# Patient Record
Sex: MALE | Race: OTHER | HISPANIC OR LATINO | URBAN - METROPOLITAN AREA
[De-identification: names, ages, dates, MRNs, and addresses within clinical notes are randomized per-mention and may not be internally consistent; named-entity substitution may affect disease eponyms.]

---

## 2018-01-15 ENCOUNTER — EMERGENCY (EMERGENCY)
Facility: HOSPITAL | Age: 23
LOS: 0 days | Discharge: HOME | End: 2018-01-16
Attending: EMERGENCY MEDICINE | Admitting: EMERGENCY MEDICINE
Payer: MEDICAID

## 2018-01-15 VITALS
SYSTOLIC BLOOD PRESSURE: 133 MMHG | RESPIRATION RATE: 18 BRPM | DIASTOLIC BLOOD PRESSURE: 76 MMHG | TEMPERATURE: 98 F | OXYGEN SATURATION: 100 % | HEART RATE: 77 BPM

## 2018-01-15 VITALS — WEIGHT: 175.05 LBS | HEIGHT: 69 IN

## 2018-01-15 DIAGNOSIS — S09.90XA UNSPECIFIED INJURY OF HEAD, INITIAL ENCOUNTER: ICD-10-CM
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
## 2018-01-15 DIAGNOSIS — R55 SYNCOPE AND COLLAPSE: ICD-10-CM
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
## 2018-01-15 LAB
ALBUMIN SERPL ELPH-MCNC: 4.3 G/DL — SIGNIFICANT CHANGE UP (ref 3.3–5)
ALP SERPL-CCNC: 95 U/L — SIGNIFICANT CHANGE UP (ref 40–120)
ALT FLD-CCNC: 45 U/L — SIGNIFICANT CHANGE UP (ref 12–78)
ANION GAP SERPL CALC-SCNC: 9 MMOL/L — SIGNIFICANT CHANGE UP (ref 5–17)
AST SERPL-CCNC: 39 U/L — HIGH (ref 15–37)
BASOPHILS # BLD AUTO: 0.1 K/UL — SIGNIFICANT CHANGE UP (ref 0–0.2)
BASOPHILS NFR BLD AUTO: 0.9 % — SIGNIFICANT CHANGE UP (ref 0–2)
BILIRUB SERPL-MCNC: 0.6 MG/DL — SIGNIFICANT CHANGE UP (ref 0.2–1.2)
BUN SERPL-MCNC: 13 MG/DL — SIGNIFICANT CHANGE UP (ref 7–23)
CALCIUM SERPL-MCNC: 9.7 MG/DL — SIGNIFICANT CHANGE UP (ref 8.5–10.1)
CHLORIDE SERPL-SCNC: 106 MMOL/L — SIGNIFICANT CHANGE UP (ref 96–108)
CO2 SERPL-SCNC: 26 MMOL/L — SIGNIFICANT CHANGE UP (ref 22–31)
CREAT SERPL-MCNC: 1.01 MG/DL — SIGNIFICANT CHANGE UP (ref 0.5–1.3)
EOSINOPHIL # BLD AUTO: 0 K/UL — SIGNIFICANT CHANGE UP (ref 0–0.5)
EOSINOPHIL NFR BLD AUTO: 0.2 % — SIGNIFICANT CHANGE UP (ref 0–6)
GLUCOSE SERPL-MCNC: 82 MG/DL — SIGNIFICANT CHANGE UP (ref 70–99)
HCT VFR BLD CALC: 45.8 % — SIGNIFICANT CHANGE UP (ref 39–50)
HGB BLD-MCNC: 15.2 G/DL — SIGNIFICANT CHANGE UP (ref 13–17)
LYMPHOCYTES # BLD AUTO: 1.8 K/UL — SIGNIFICANT CHANGE UP (ref 1–3.3)
LYMPHOCYTES # BLD AUTO: 13.7 % — SIGNIFICANT CHANGE UP (ref 13–44)
MCHC RBC-ENTMCNC: 29.1 PG — SIGNIFICANT CHANGE UP (ref 27–34)
MCHC RBC-ENTMCNC: 33.1 GM/DL — SIGNIFICANT CHANGE UP (ref 32–36)
MCV RBC AUTO: 87.8 FL — SIGNIFICANT CHANGE UP (ref 80–100)
MONOCYTES # BLD AUTO: 0.8 K/UL — SIGNIFICANT CHANGE UP (ref 0–0.9)
MONOCYTES NFR BLD AUTO: 5.9 % — SIGNIFICANT CHANGE UP (ref 2–14)
NEUTROPHILS # BLD AUTO: 10.3 K/UL — HIGH (ref 1.8–7.4)
NEUTROPHILS NFR BLD AUTO: 79.3 % — HIGH (ref 43–77)
PLATELET # BLD AUTO: 282 K/UL — SIGNIFICANT CHANGE UP (ref 150–400)
POTASSIUM SERPL-MCNC: 3.4 MMOL/L — LOW (ref 3.5–5.3)
POTASSIUM SERPL-SCNC: 3.4 MMOL/L — LOW (ref 3.5–5.3)
PROT SERPL-MCNC: 8.1 GM/DL — SIGNIFICANT CHANGE UP (ref 6–8.3)
RBC # BLD: 5.21 M/UL — SIGNIFICANT CHANGE UP (ref 4.2–5.8)
RBC # FLD: 11.2 % — SIGNIFICANT CHANGE UP (ref 10.3–14.5)
SODIUM SERPL-SCNC: 141 MMOL/L — SIGNIFICANT CHANGE UP (ref 135–145)
WBC # BLD: 13 K/UL — HIGH (ref 3.8–10.5)
WBC # FLD AUTO: 13 K/UL — HIGH (ref 3.8–10.5)

## 2018-01-15 PROCEDURE — 70450 CT HEAD/BRAIN W/O DYE: CPT | Mod: 26

## 2018-01-15 PROCEDURE — 99284 EMERGENCY DEPT VISIT MOD MDM: CPT

## 2018-01-15 PROCEDURE — 93010 ELECTROCARDIOGRAM REPORT: CPT

## 2018-01-15 RX ORDER — SODIUM CHLORIDE 9 MG/ML
1000 INJECTION INTRAMUSCULAR; INTRAVENOUS; SUBCUTANEOUS ONCE
Qty: 0 | Refills: 0 | Status: COMPLETED | OUTPATIENT
Start: 2018-01-15 | End: 2018-01-15

## 2018-01-15 RX ADMIN — SODIUM CHLORIDE 1000 MILLILITER(S): 9 INJECTION INTRAMUSCULAR; INTRAVENOUS; SUBCUTANEOUS at 22:15

## 2018-01-15 NOTE — ED STATDOCS - CARE PLAN
Principal Discharge DX:	Syncope, unspecified syncope type  Secondary Diagnosis:	Injury of head, initial encounter
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
## 2018-01-15 NOTE — ED STATDOCS - ATTENDING CONTRIBUTION TO CARE
Attending Contribution to Care: I, Adriana Lopez, performed the initial face to face bedside interview with this patient regarding history of present illness, review of symptoms and relevant past medical, social and family history.  I completed an independent physical examination.  I was the initial provider who evaluated this patient and the history, physical, and MDM reflect this intial assessment. I have signed out the follow up of any pending tests after the original (i.e. labs, radiological studies) to the ACP with instructions to review any with instructions to review any concerning findings to me prior to discharge.  I have communicated the patient’s plan of care and disposition with the ACP.
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
## 2018-01-15 NOTE — ED ADULT NURSE REASSESSMENT NOTE - NS ED NURSE REASSESS COMMENT FT1
Pt resting comfortably in chair with no acute distress noted. states feels better. Denies cp,sob,ha,dz,n/v/d/fever/chills or urinary sx at this time. Vitals stable. plan of cared updated on pt. Will cont to monitor for safety and comfort.

## 2019-01-16 ENCOUNTER — EMERGENCY (EMERGENCY)
Facility: HOSPITAL | Age: 24
LOS: 1 days | Discharge: HOME | End: 2019-01-16
Attending: EMERGENCY MEDICINE
Payer: COMMERCIAL

## 2019-01-16 VITALS
DIASTOLIC BLOOD PRESSURE: 65 MMHG | RESPIRATION RATE: 18 BRPM | HEART RATE: 107 BPM | SYSTOLIC BLOOD PRESSURE: 114 MMHG | OXYGEN SATURATION: 97 % | TEMPERATURE: 98 F

## 2019-01-16 VITALS — WEIGHT: 171.96 LBS | HEIGHT: 67 IN

## 2019-01-16 LAB
APPEARANCE UR: CLEAR — SIGNIFICANT CHANGE UP
BILIRUB UR-MCNC: NEGATIVE — SIGNIFICANT CHANGE UP
COLOR SPEC: YELLOW — SIGNIFICANT CHANGE UP
DIFF PNL FLD: NEGATIVE — SIGNIFICANT CHANGE UP
EPI CELLS # UR: SIGNIFICANT CHANGE UP
GLUCOSE UR QL: NEGATIVE MG/DL — SIGNIFICANT CHANGE UP
KETONES UR-MCNC: ABNORMAL
LEUKOCYTE ESTERASE UR-ACNC: ABNORMAL
NITRITE UR-MCNC: NEGATIVE — SIGNIFICANT CHANGE UP
PH UR: 5 — SIGNIFICANT CHANGE UP (ref 5–8)
PROT UR-MCNC: 15 MG/DL
RBC CASTS # UR COMP ASSIST: SIGNIFICANT CHANGE UP /HPF (ref 0–4)
SP GR SPEC: 1.02 — SIGNIFICANT CHANGE UP (ref 1.01–1.02)
UROBILINOGEN FLD QL: NEGATIVE MG/DL — SIGNIFICANT CHANGE UP
WBC UR QL: SIGNIFICANT CHANGE UP

## 2019-01-16 PROCEDURE — 81001 URINALYSIS AUTO W/SCOPE: CPT

## 2019-01-16 PROCEDURE — 99284 EMERGENCY DEPT VISIT MOD MDM: CPT | Mod: 25

## 2019-01-16 PROCEDURE — 87491 CHLMYD TRACH DNA AMP PROBE: CPT

## 2019-01-16 PROCEDURE — 87591 N.GONORRHOEAE DNA AMP PROB: CPT

## 2019-01-16 PROCEDURE — 76870 US EXAM SCROTUM: CPT

## 2019-01-16 PROCEDURE — 96372 THER/PROPH/DIAG INJ SC/IM: CPT

## 2019-01-16 PROCEDURE — 76870 US EXAM SCROTUM: CPT | Mod: 26

## 2019-01-16 PROCEDURE — 99284 EMERGENCY DEPT VISIT MOD MDM: CPT

## 2019-01-16 RX ORDER — KETOROLAC TROMETHAMINE 30 MG/ML
60 SYRINGE (ML) INJECTION ONCE
Qty: 0 | Refills: 0 | Status: DISCONTINUED | OUTPATIENT
Start: 2019-01-16 | End: 2019-01-16

## 2019-01-16 RX ADMIN — Medication 60 MILLIGRAM(S): at 20:45

## 2019-01-16 NOTE — ED PROVIDER NOTE - CPE EDP EYES NORM
Received PA request for Contour next strips. When [facility] stated they could not find pt in their system. Tried to reach out to the parents of Dominga Knott to see if insurance has changed. No answer, left VM to call back.
normal...

## 2019-01-16 NOTE — ED PROVIDER NOTE - PHYSICAL EXAMINATION
/testicular: Normal lie, no significant swelling, left testicle mildly tender to palpation, venous "bag of worms like" sensation present posterior left testicle

## 2019-01-16 NOTE — ED PROVIDER NOTE - ATTENDING CONTRIBUTION TO CARE
Noemy: I performed a face to face bedside interview with patient regarding history of present illness, review of symptoms and past medical history. I completed an independent physical exam.  I have discussed patient's plan of care with advanced care provider.   I agree with note as stated above including HISTORY OF PRESENT ILLNESS, HIV, PAST MEDICAL/SURGICAL/FAMILY/SOCIAL HISTORY, ALLERGIES AND HOME MEDICATIONS, REVIEW OF SYSTEMS, PHYSICAL EXAM, MEDICAL DECISION MAKING and any PROGRESS NOTES during the time I functioned as the attending physician for this patient  unless otherwise noted. My brief assessment is as follows: 1 week of b/l testicle pain L>R, denies trauma. States noticed it when weight lifting. No discharge/dysuria. Sexually active with fiance only. No abd pain. Denies acute change in pain. nad, comfortable, abd benign. testicles with normal lie, no significant swelling, mild generalized tenderness, without tenderness over epididymis, posterior lower testicle with palpable venous plexus L>R. likely mild varicocele, u/s r/o torsion. urology f/u.

## 2019-01-16 NOTE — ED PROVIDER NOTE - OBJECTIVE STATEMENT
23y Male no pmhx presents to ED complaining of left testicular pain x 1 week. Patient states he was in working out in the gym when he first noticed the pain but states it came on suddenly with no known injury or trauma. Patient states the pain has been persistent for 1 week and worse when he sits down for prolonged periods of time. He states if he moves around or focuses on other things he can ignore the pain. Patient notes some swelling to left testicular area but no redness. Patient states he is sexually active with 1 female partner. No history STI/STD. Patient denies fever, chills, discharge, dysuria, frequency, urgency, n/v/d, headache, abdominal pain.

## 2019-01-16 NOTE — ED ADULT NURSE NOTE - NSIMPLEMENTINTERV_GEN_ALL_ED
Implemented All Universal Safety Interventions:  [place] to call system. Call bell, personal items and telephone within reach. Instruct patient to call for assistance. Room bathroom lighting operational. Non-slip footwear when patient is off stretcher. Physically safe environment: no spills, clutter or unnecessary equipment. Stretcher in lowest position, wheels locked, appropriate side rails in place.

## 2019-01-16 NOTE — ED PROVIDER NOTE - MEDICAL DECISION MAKING DETAILS
23y male presenting for L testicular pain x 1 week. Testicular sono to eval for torsion. UA, urine gc/chlamydia. will re-assess

## 2019-01-18 LAB
C TRACH RRNA SPEC QL NAA+PROBE: SIGNIFICANT CHANGE UP
N GONORRHOEA RRNA SPEC QL NAA+PROBE: SIGNIFICANT CHANGE UP
SPECIMEN SOURCE: SIGNIFICANT CHANGE UP

## 2021-01-09 ENCOUNTER — EMERGENCY (EMERGENCY)
Facility: HOSPITAL | Age: 26
LOS: 1 days | Discharge: HOME | End: 2021-01-09
Attending: EMERGENCY MEDICINE
Payer: COMMERCIAL

## 2021-01-09 VITALS
TEMPERATURE: 97 F | WEIGHT: 160.06 LBS | DIASTOLIC BLOOD PRESSURE: 77 MMHG | OXYGEN SATURATION: 100 % | SYSTOLIC BLOOD PRESSURE: 133 MMHG | RESPIRATION RATE: 18 BRPM | HEIGHT: 67 IN | HEART RATE: 85 BPM

## 2021-01-09 PROCEDURE — 99284 EMERGENCY DEPT VISIT MOD MDM: CPT | Mod: 25

## 2021-01-09 PROCEDURE — 70450 CT HEAD/BRAIN W/O DYE: CPT | Mod: 26

## 2021-01-09 PROCEDURE — 70450 CT HEAD/BRAIN W/O DYE: CPT

## 2021-01-09 PROCEDURE — 29515 APPLICATION SHORT LEG SPLINT: CPT

## 2021-01-09 PROCEDURE — 73610 X-RAY EXAM OF ANKLE: CPT

## 2021-01-09 PROCEDURE — 73562 X-RAY EXAM OF KNEE 3: CPT | Mod: 26,LT

## 2021-01-09 PROCEDURE — 73562 X-RAY EXAM OF KNEE 3: CPT

## 2021-01-09 PROCEDURE — 73080 X-RAY EXAM OF ELBOW: CPT | Mod: 26,50

## 2021-01-09 PROCEDURE — 72100 X-RAY EXAM L-S SPINE 2/3 VWS: CPT

## 2021-01-09 PROCEDURE — 73610 X-RAY EXAM OF ANKLE: CPT | Mod: 26,LT

## 2021-01-09 PROCEDURE — 73080 X-RAY EXAM OF ELBOW: CPT

## 2021-01-09 PROCEDURE — 72125 CT NECK SPINE W/O DYE: CPT | Mod: 26

## 2021-01-09 PROCEDURE — 72125 CT NECK SPINE W/O DYE: CPT

## 2021-01-09 PROCEDURE — 29515 APPLICATION SHORT LEG SPLINT: CPT | Mod: LT

## 2021-01-09 PROCEDURE — 72100 X-RAY EXAM L-S SPINE 2/3 VWS: CPT | Mod: 26

## 2021-01-09 RX ORDER — METHOCARBAMOL 500 MG/1
1500 TABLET, FILM COATED ORAL ONCE
Refills: 0 | Status: COMPLETED | OUTPATIENT
Start: 2021-01-09 | End: 2021-01-09

## 2021-01-09 RX ORDER — IBUPROFEN 200 MG
600 TABLET ORAL ONCE
Refills: 0 | Status: COMPLETED | OUTPATIENT
Start: 2021-01-09 | End: 2021-01-09

## 2021-01-09 RX ADMIN — METHOCARBAMOL 1500 MILLIGRAM(S): 500 TABLET, FILM COATED ORAL at 22:39

## 2021-01-09 RX ADMIN — Medication 600 MILLIGRAM(S): at 22:39
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
## 2021-01-09 NOTE — ED ADULT TRIAGE NOTE - WEIGHT METHOD
I reviewed the H&P, I examined the patient, and there are no changes in the patient's condition.     stated
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
## 2021-01-10 NOTE — ED PROVIDER NOTE - CARE PROVIDER_API CALL
Shaneka Muñoz)  Orthopedics  [address] [place] [address], [address]  [place] [address]  Phone: [phone]  Fax: [phone]  Follow Up Time:
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
## 2021-01-10 NOTE — ED PROVIDER NOTE - CARE PLAN
Principal Discharge DX:	Neck pain  Secondary Diagnosis:	Back pain  Secondary Diagnosis:	Ankle pain  Secondary Diagnosis:	Elbow pain
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
## 2021-01-10 NOTE — ED PROVIDER NOTE - NSFOLLOWUPINSTRUCTIONS_ED_ALL_ED_FT

## 2021-01-13 ENCOUNTER — APPOINTMENT (OUTPATIENT)
Dept: ORTHOPEDIC SURGERY | Facility: CLINIC | Age: 26
End: 2021-01-13
Payer: COMMERCIAL

## 2021-01-13 VITALS — SYSTOLIC BLOOD PRESSURE: 114 MMHG | DIASTOLIC BLOOD PRESSURE: 74 MMHG | HEART RATE: 82 BPM

## 2021-01-13 PROCEDURE — 73590 X-RAY EXAM OF LOWER LEG: CPT | Mod: LT

## 2021-01-13 PROCEDURE — 97760 ORTHOTIC MGMT&TRAING 1ST ENC: CPT

## 2021-01-13 PROCEDURE — 99072 ADDL SUPL MATRL&STAF TM PHE: CPT

## 2021-01-13 PROCEDURE — 99204 OFFICE O/P NEW MOD 45 MIN: CPT | Mod: 25

## 2021-01-13 RX ORDER — ASPIRIN 325 MG/1
325 TABLET, FILM COATED ORAL DAILY
Qty: 30 | Refills: 0 | Status: ACTIVE | COMMUNITY
Start: 2021-01-13 | End: 1900-01-01
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
## 2021-01-13 NOTE — DISCUSSION/SUMMARY
[de-identified] : Today I had a lengthy discussion with the patient regarding their left ankle pain. I have addressed all the patient's concerns surrounding the pathology of their condition. I recommend that the patient utilize a CAM boot. The patient was fitted with a CAM boot in the office today. The patient was educated about the boot wear pattern and utilization, as well as the timeframe to come out of the boot. He was also given full instructions for using the boot. I advised the patient to utilize 325 mg of aspirin as instructed for blood thinning purposes. The prescription for the aspirin was provided for the patient today. I recommend that the patient utilize ice, NSAIDS PRN, and heat. He can also elevate the left ankle above the level of the heart. At this time I would like to obtain advanced imaging of the patient's left ankle.  An MRI was ordered so I can find out more about the etiology of the patient's condition. The patient should follow up with the office after obtaining the MRI. The patient understood and verbally agreed to the treatment plan. All of their questions were answered and they were satisfied with the visit. The patient should call the office if they have any questions or experience worsening symptoms.

## 2021-01-13 NOTE — PHYSICAL EXAM
[de-identified] : General: Alert and oriented x3. In no acute distress. Pleasant in nature with a normal affect. No apparent respiratory distress.\par \par L Ankle Exam\par Skin: Clean, dry, intact\par Inspection: No obvious malalignment, no swelling, no effusion; no lymphadenopathy\par Pulses: 2+ DP/PT pulses\par ROM: L Ankle 10 degrees of dorsiflexion, 40 degrees of plantarflexion, 10 degrees of subtalar motion\par Tenderness: +syndesmosis pain. +medial sided pain, No tenderness over the lateral malleolus, no CFL/ATFL/PTFL pain. +medial malleolus pain pain, +medial deltoid ligament pain. +Positive proximal fibular pain. No heel pain.\par Stability: Negative anterior/posterior drawer.\par Strength: 5/5 TA/GS/EHL\par Neuro: In tact to light touch throughout\par Additional tests: Negative Mortons test, Negative syndesmosis squeeze test.  [de-identified] : 2V of the left tib-fib were ordered obtained and reviewed by me today, 01/13/2021 , revealed: No fracture.\par \par \par EXAM: ELBOW-BILATERAL\par  EXAM: XR KNEE 3 VIEWS LT\par  EXAM: ANKLE-LEFT\par \par PROCEDURE DATE: 01/09/2021\par \par \par \par INTERPRETATION: INDICATION: Trauma.\par \par TECHNIQUE: Frontal, lateral, and oblique views of the left ankle, knee, and elbow were performed and submitted for interpretation.\par \par COMPARISON: None.\par \par FINDINGS:\par \par There is no fracture or dislocation. Joint spaces and bone mineralization are within normal limits. Ankle mortise is intact. No knee or elbow joint effusion. Soft tissues are unremarkable for calcification or foreign body.\par \par IMPRESSION: No fracture.\par \par \par \par \par \par DAVID GOMEZ MD; Attending Radiologist\par This document has been electronically signed. Paul 10 2021 5:17AM

## 2021-01-13 NOTE — ADDENDUM
[FreeTextEntry1] : I, Jose Cruz Markham, acted solely as a scribe for Dr. Vitor Ortega on this date 01/13/2021 .\par All medical record entries made by the Scribe were at my, Dr. Vitor Ortega, direction and personally dictated by me on 01/13/2021 . I have reviewed the chart and agree that the record accurately reflects my personal performance of the history, physical exam, assessment and plan. I have also personally directed, reviewed, and agreed with the chart.

## 2021-01-13 NOTE — CONSULT LETTER
[Consult Letter:] : I had the pleasure of evaluating your patient, [unfilled]. [Please see my note below.] : Please see my note below. [Consult Closing:] : Thank you very much for allowing me to participate in the care of this patient.  If you have any questions, please do not hesitate to contact me. [Sincerely,] : Sincerely, [FreeTextEntry3] : Vitor Ortega, DO\par Foot and Ankle Surgery\par

## 2021-01-19 ENCOUNTER — OUTPATIENT (OUTPATIENT)
Dept: OUTPATIENT SERVICES | Facility: HOSPITAL | Age: 26
LOS: 1 days | End: 2021-01-19

## 2021-01-19 ENCOUNTER — RESULT REVIEW (OUTPATIENT)
Age: 26
End: 2021-01-19

## 2021-01-19 ENCOUNTER — APPOINTMENT (OUTPATIENT)
Dept: MRI IMAGING | Facility: CLINIC | Age: 26
End: 2021-01-19
Payer: COMMERCIAL

## 2021-01-19 PROCEDURE — 73721 MRI JNT OF LWR EXTRE W/O DYE: CPT | Mod: 26,LT

## 2021-01-22 ENCOUNTER — APPOINTMENT (OUTPATIENT)
Dept: ORTHOPEDIC SURGERY | Facility: CLINIC | Age: 26
End: 2021-01-22
Payer: COMMERCIAL
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
## 2021-01-22 DIAGNOSIS — S99.912A UNSPECIFIED INJURY OF LEFT ANKLE, INITIAL ENCOUNTER: ICD-10-CM

## 2021-01-22 DIAGNOSIS — S93.422A SPRAIN OF DELTOID LIGAMENT OF LEFT ANKLE, INITIAL ENCOUNTER: ICD-10-CM

## 2021-01-22 PROCEDURE — 99214 OFFICE O/P EST MOD 30 MIN: CPT

## 2021-01-22 PROCEDURE — 99072 ADDL SUPL MATRL&STAF TM PHE: CPT

## 2021-01-22 NOTE — ADDENDUM
[FreeTextEntry1] : I, Jose Cruz Markham, acted solely as a scribe for Dr. Vitor Ortega on this date 01/22/2021 .\par All medical record entries made by the Scribe were at my, Dr. Vitor Ortega, direction and personally dictated by me on 01/22/2021 . I have reviewed the chart and agree that the record accurately reflects my personal performance of the history, physical exam, assessment and plan. I have also personally directed, reviewed, and agreed with the chart.

## 2021-01-22 NOTE — DISCUSSION/SUMMARY
[de-identified] : Today I had a lengthy discussion with the patient regarding their left ankle pain. I have addressed all the patient's concerns surrounding the pathology of their condition. The MRI results were reviewed with the patient today. I recommend that the patient transition out of their CAM boot and into an ASO brace as tolerated. The patient was provided with the ASO brace in the office today. I recommend the patient undergo a course of physical therapy for the left ankle 2-3 times a week for a total of 6-8 weeks. A prescription was given for the physical therapy today. I would like to see the patient back in the office in 1 month to reassess their condition. The patient understood and verbally agreed to the treatment plan. All of their questions were answered and they were satisfied with the visit. The patient should call the office if they have any questions or experience worsening symptoms.

## 2021-01-22 NOTE — PHYSICAL EXAM
[de-identified] : General: Alert and oriented x3. In no acute distress. Pleasant in nature with a normal affect. No apparent respiratory distress.\par \par L Ankle Exam\par Skin: Clean, dry, intact\par Inspection: No obvious malalignment, no swelling, no effusion; no lymphadenopathy\par Pulses: 2+ DP/PT pulses\par ROM: L Ankle 10 degrees of dorsiflexion, 40 degrees of plantarflexion, 10 degrees of subtalar motion\par Tenderness: +syndesmosis pain. +medial sided pain, No tenderness over the lateral malleolus, no CFL/ATFL/PTFL pain. +medial malleolus pain pain, +medial deltoid ligament pain. +Positive proximal fibular pain. No heel pain.\par Stability: Negative anterior/posterior drawer.\par Strength: 5/5 TA/GS/EHL\par Neuro: In tact to light touch throughout\par Additional tests: Negative Mortons test, Negative syndesmosis squeeze test.  [de-identified] : An MRI was obtained of the left ankle from [facility] on 1/19/2021 and reviewed by me today 01/22/2021  in the office. Revealed: Remote ankle sprain with mild attenuation of the ATFL. The CFL and deltoid ligaments are intact.

## 2021-02-22 ENCOUNTER — APPOINTMENT (OUTPATIENT)
Dept: ORTHOPEDIC SURGERY | Facility: CLINIC | Age: 26
End: 2021-02-22

## 2022-01-16 NOTE — HISTORY OF PRESENT ILLNESS
Subjective:     HPI:   Tyrone Santos is a 56 y.o. male who presents 12 weeks out from left NATALIYA     Date of surgery: 8/23/21    Medications: tylenol    Assistive Devices: using walker out of house, using cane today    Limitations: 10lb lifting    Patient did have a fall in early November.  A CT scan was reviewed was read as possible superior lateral acetabular rim fracture but I think this was intraoperative change his implants look well fixed well oriented well aligned       Objective:   Body mass index is 30.41 kg/m².  Exam:    Gait: limp/antalgic/trendelenburg none    Incision: healed    Active straight leg raise: good strength, no discomfort    Extension: 0    Flexion: 110    Abduction: 40    Adduction: 30    External rotation: 40    Internal rotation: 20    LLD: 0 cm supine       Imaging:  Indication:  Exam status post left total hip arthroplasty  Exam Ordered: Radiographs taken today include an anteroposterior pelvis  Details of Examination: Today's exam show a well fixed, well positioned total hip arthroplasty with no evidence of wear, osteolysis, or loosening.  Impression:  Status post left total hip arthroplasty, implant in good position with no abnormality    No change      Assessment:       ICD-10-CM ICD-9-CM   1. Presence of left artificial hip joint  Z96.642 V43.64      Doing well     Plan:       Patient is doing very well with the hip replacement at this time.  They will continue routine care of their hip and see me for their routine follow-up.  If there are problems in the interim they will see me back sooner. Prophylactic antibiotic protocol given and explained to patient.     Aleve/tylenol  Cane until single leg stance  Phase 2 abductor strengthening    RTW tomorrow: 10lb lifting limit, periodic breaks     3months f/u AP pelvis XR      No orders of the defined types were placed in this encounter.            Past Medical History:   Diagnosis Date    Addiction to drug     Alcohol abuse      Anxiety     Bipolar disorder     Depression     Difficulty urinating     GERD (gastroesophageal reflux disease)     Hx of psychiatric care     Hyperlipidemia     Hypertension     Ade     Psychiatric problem     S/P cervical spinal fusion 2018    S/P laparoscopic appendectomy 1/3/2017    Therapy     Thyroid disease        Past Surgical History:   Procedure Laterality Date    ANTERIOR CERVICAL DISCECTOMY W/ FUSION N/A 10/23/2018    Procedure: DISCECTOMY, SPINE, CERVICAL, ANTERIOR APPROACH, WITH FUSION C3-4;  Surgeon: Jw Anne MD;  Location: [address];  Service: Neurosurgery;  Laterality: N/A;    APPENDECTOMY      around     ARTHROPLASTY OF HIP BY ANTERIOR APPROACH Left 2021    Procedure: ARTHROPLASTY, HIP, TOTAL, ANTERIOR APPROACH:LEFT:DPEUY-ACTIS+PINNACLE;  Surgeon: Ángel Lam III, MD;  Location: [facility];  Service: Orthopedics;  Laterality: Left;    INJECTION OF ANESTHETIC AGENT AROUND NERVE Left 2021    Procedure: BLOCK, NERVE, OBTURATOR and FEMORAL;  Surgeon: Josefina Lee MD;  Location: [facility];  Service: Pain Management;  Laterality: Left;    SCAPHOID FRACTURE SURGERY      had to have a bone graft -early       WRIST SURGERY      6 years ago       Family History   Problem Relation Age of Onset    Alcohol abuse Mother     Alcohol abuse Maternal Uncle     Heart disease Father     No Known Problems Sister     No Known Problems Brother     No Known Problems Son     No Known Problems Sister     Cancer Sister     No Known Problems Brother     No Known Problems Brother        Social History     Socioeconomic History    Marital status:     Number of children: 1   Occupational History    Occupation:    Tobacco Use    Smoking status: Former Smoker     Quit date:      Years since quittin.0    Smokeless tobacco: Former User   Substance and Sexual Activity    Alcohol use: Not Currently    Drug use: Not  Currently     Types: Marijuana, Methamphetamines    Sexual activity: Never                [FreeTextEntry1] : 25 year old male presenting with left ankle pain. The patient’s pain is noted to be a 6/10. The pain and swelling are noted to be the same compared to the previous visit. The patient presents wearing a CAM boot. The patient presents ambulating in crutches. He presents today to review his MRI results. He is currently taking aspirin. No other complaints at this time.

## 2023-04-19 ENCOUNTER — NON-APPOINTMENT (OUTPATIENT)
Age: 28
End: 2023-04-19

## 2024-01-05 NOTE — ED PROVIDER NOTE - PROGRESS NOTE DETAILS
none
Re-evaluated patient. Patient states his pain is improved at this time compared to presentation. Discussed  sonogram findings: varicocele with patient. All patient's questions regarding diagnosis answered. Informed patient he will follow-up with Urology for further evaluation. Patient understands and agrees to plan of care.